# Patient Record
Sex: MALE | Race: OTHER | HISPANIC OR LATINO | Employment: FULL TIME | URBAN - NONMETROPOLITAN AREA
[De-identification: names, ages, dates, MRNs, and addresses within clinical notes are randomized per-mention and may not be internally consistent; named-entity substitution may affect disease eponyms.]

---

## 2023-02-03 ENCOUNTER — APPOINTMENT (EMERGENCY)
Dept: RADIOLOGY | Facility: HOSPITAL | Age: 18
End: 2023-02-03

## 2023-02-03 ENCOUNTER — HOSPITAL ENCOUNTER (EMERGENCY)
Facility: HOSPITAL | Age: 18
Discharge: HOME | End: 2023-02-03
Attending: EMERGENCY MEDICINE

## 2023-02-03 VITALS
RESPIRATION RATE: 15 BRPM | HEART RATE: 86 BPM | TEMPERATURE: 97.9 F | BODY MASS INDEX: 24.99 KG/M2 | WEIGHT: 150 LBS | DIASTOLIC BLOOD PRESSURE: 69 MMHG | HEIGHT: 65 IN | SYSTOLIC BLOOD PRESSURE: 129 MMHG | OXYGEN SATURATION: 98 %

## 2023-02-03 DIAGNOSIS — R45.89 SAD: ICD-10-CM

## 2023-02-03 DIAGNOSIS — F41.9 ANXIETY: Primary | ICD-10-CM

## 2023-02-03 LAB
ANION GAP SERPL CALCULATED.3IONS-SCNC: 6 MMOL/L (ref 4–13)
BASOPHILS # BLD AUTO: 0.03 THOUSANDS/ÂΜL (ref 0–0.1)
BASOPHILS NFR BLD AUTO: 1 % (ref 0–1)
BUN SERPL-MCNC: 10 MG/DL (ref 5–25)
CALCIUM SERPL-MCNC: 9.4 MG/DL (ref 8.4–10.2)
CHLORIDE SERPL-SCNC: 107 MMOL/L (ref 96–108)
CO2 SERPL-SCNC: 26 MMOL/L (ref 21–32)
CREAT SERPL-MCNC: 0.8 MG/DL (ref 0.6–1.3)
EOSINOPHIL # BLD AUTO: 0.06 THOUSAND/ÂΜL (ref 0–0.61)
EOSINOPHIL NFR BLD AUTO: 1 % (ref 0–6)
ERYTHROCYTE [DISTWIDTH] IN BLOOD BY AUTOMATED COUNT: 12.6 % (ref 11.6–15.1)
GFR SERPL CREATININE-BSD FRML MDRD: 129 ML/MIN/1.73SQ M
GLUCOSE SERPL-MCNC: 97 MG/DL (ref 65–140)
HCT VFR BLD AUTO: 43.7 % (ref 36.5–49.3)
HGB BLD-MCNC: 14.6 G/DL (ref 12–17)
IMM GRANULOCYTES # BLD AUTO: 0.02 THOUSAND/UL (ref 0–0.2)
IMM GRANULOCYTES NFR BLD AUTO: 0 % (ref 0–2)
LYMPHOCYTES # BLD AUTO: 1.47 THOUSANDS/ÂΜL (ref 0.6–4.47)
LYMPHOCYTES NFR BLD AUTO: 22 % (ref 14–44)
MAGNESIUM SERPL-MCNC: 2 MG/DL (ref 1.9–2.7)
MCH RBC QN AUTO: 28.6 PG (ref 26.8–34.3)
MCHC RBC AUTO-ENTMCNC: 33.4 G/DL (ref 31.4–37.4)
MCV RBC AUTO: 86 FL (ref 82–98)
MONOCYTES # BLD AUTO: 0.42 THOUSAND/ÂΜL (ref 0.17–1.22)
MONOCYTES NFR BLD AUTO: 6 % (ref 4–12)
NEUTROPHILS # BLD AUTO: 4.62 THOUSANDS/ÂΜL (ref 1.85–7.62)
NEUTS SEG NFR BLD AUTO: 70 % (ref 43–75)
NRBC BLD AUTO-RTO: 0 /100 WBCS
PLATELET # BLD AUTO: 271 THOUSANDS/UL (ref 149–390)
PMV BLD AUTO: 9.2 FL (ref 8.9–12.7)
POTASSIUM SERPL-SCNC: 3.5 MMOL/L (ref 3.5–5.3)
RBC # BLD AUTO: 5.1 MILLION/UL (ref 3.88–5.62)
SODIUM SERPL-SCNC: 139 MMOL/L (ref 135–147)
TSH SERPL DL<=0.05 MIU/L-ACNC: 1.05 UIU/ML (ref 0.45–4.5)
WBC # BLD AUTO: 6.62 THOUSAND/UL (ref 4.31–10.16)

## 2023-02-03 RX ORDER — HYDROXYZINE HYDROCHLORIDE 25 MG/1
25 TABLET, FILM COATED ORAL ONCE
Status: COMPLETED | OUTPATIENT
Start: 2023-02-03 | End: 2023-02-03

## 2023-02-03 RX ORDER — HYDROXYZINE PAMOATE 25 MG/1
25 CAPSULE ORAL 3 TIMES DAILY PRN
Qty: 30 CAPSULE | Refills: 0 | Status: SHIPPED | OUTPATIENT
Start: 2023-02-03

## 2023-02-03 RX ADMIN — HYDROXYZINE HYDROCHLORIDE 25 MG: 25 TABLET ORAL at 05:02

## 2023-02-03 NOTE — ED PROVIDER NOTES
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  He pulled over and began crying  Endorses feeling sad  Patient states he and his girlfriend broke up last week  Says that "I loved her " Since then, he has been feeling sad and more anxious  He does not want to die or hurt himself  Says he has a supportive family  Denies drugs, alcohol    Denies suicidal or homicidal ideations or self-injury  History reviewed  No pertinent past medical history  History reviewed  No pertinent surgical history  History reviewed  No pertinent family history  I have reviewed and agree with the history as documented  E-Cigarette/Vaping   • E-Cigarette Use Current Every Day User      E-Cigarette/Vaping Substances   • Nicotine Yes    • THC No    • CBD No    • Flavoring Yes    • Other No    • Unknown No      Social History     Tobacco Use   • Smoking status: Never   • Smokeless tobacco: Never   Vaping Use   • Vaping Use: Every day   • Substances: Nicotine, Flavoring   Substance Use Topics   • Alcohol use: Never   • Drug use: Not Currently       Review of Systems   Constitutional: Negative for chills and fever  HENT: Negative for ear pain and sore throat  Eyes: Negative for pain and visual disturbance  Respiratory: Negative for cough and shortness of breath  Cardiovascular: Negative for chest pain and palpitations  Gastrointestinal: Negative for abdominal pain and vomiting  Genitourinary: Negative for dysuria and hematuria  Musculoskeletal: Negative for arthralgias and back pain  Skin: Negative for color change and rash  Neurological: Negative for seizures and syncope  Psychiatric/Behavioral: Positive for dysphoric mood  Negative for self-injury and suicidal ideas  The patient is nervous/anxious  All other systems reviewed and are negative  Physical Exam  Physical Exam  Vitals and nursing note reviewed  Constitutional:       Appearance: He is well-developed  HENT:      Head: Normocephalic and atraumatic  Right Ear: External ear normal       Left Ear: External ear normal       Nose: Nose normal    Eyes:      Conjunctiva/sclera: Conjunctivae normal    Cardiovascular:      Rate and Rhythm: Normal rate and regular rhythm  Pulmonary:      Effort: Pulmonary effort is normal  No respiratory distress  Breath sounds: Normal breath sounds  Abdominal:      Palpations: Abdomen is soft  Tenderness: There is no abdominal tenderness  Musculoskeletal:      Cervical back: Normal range of motion and neck supple  Skin:     General: Skin is warm and dry  Neurological:      General: No focal deficit present  Mental Status: He is alert  Mental status is at baseline  Psychiatric:         Mood and Affect: Mood is anxious and depressed  Affect is flat and tearful  Behavior: Behavior is withdrawn  Thought Content: Thought content does not include homicidal or suicidal ideation  Comments: Soft spoken voice  Tearful when speaking about his ex-girlfriend           Vital Signs  ED Triage Vitals [02/03/23 0445]   Temperature Pulse Respirations Blood Pressure SpO2   97 9 °F (36 6 °C) 104 18 142/85 96 %      Temp Source Heart Rate Source Patient Position - Orthostatic VS BP Location FiO2 (%)   Temporal Monitor Sitting Left arm --      Pain Score       8           Vitals:    02/03/23 0445 02/03/23 0600   BP: 142/85 129/69   Pulse: 104 86   Patient Position - Orthostatic VS: Sitting Sitting Visual Acuity      ED Medications  Medications   hydrOXYzine HCL (ATARAX) tablet 25 mg (25 mg Oral Given 2/3/23 0502)       Diagnostic Studies  Results Reviewed     Procedure Component Value Units Date/Time    TSH [[id number]]  (Normal) Collected: 02/03/23 0505    Lab Status: Final result Specimen: Blood from Arm, Right Updated: 02/03/23 0546     TSH 3RD GENERATON 1 052 uIU/mL     Narrative:      Patients undergoing fluorescein dye angiography may retain small amounts of fluorescein in the body for 48-72 hours post procedure  Samples containing fluorescein can produce falsely depressed TSH values  If the patient had this procedure,a specimen should be resubmitted post fluorescein clearance        Basic metabolic panel [[id number]] Collected: 02/03/23 0505    Lab Status: Final result Specimen: Blood from Arm, Right Updated: 02/03/23 0534     Sodium 139 mmol/L      Potassium 3 5 mmol/L      Chloride 107 mmol/L      CO2 26 mmol/L      ANION GAP 6 mmol/L      BUN 10 mg/dL      Creatinine 0 80 mg/dL      Glucose 97 mg/dL      Calcium 9 4 mg/dL      eGFR 129 ml/min/1 73sq m     Narrative:      [facility] guidelines for Chronic Kidney Disease (CKD):   •  Stage 1 with normal or high GFR (GFR > 90 mL/min/1 73 square meters)  •  Stage 2 Mild CKD (GFR = 60-89 mL/min/1 73 square meters)  •  Stage 3A Moderate CKD (GFR = 45-59 mL/min/1 73 square meters)  •  Stage 3B Moderate CKD (GFR = 30-44 mL/min/1 73 square meters)  •  Stage 4 Severe CKD (GFR = 15-29 mL/min/1 73 square meters)  •  Stage 5 End Stage CKD (GFR <15 mL/min/1 73 square meters)  Note: GFR calculation is accurate only with a steady state creatinine    Magnesium [[id number]]  (Normal) Collected: 02/03/23 0505    Lab Status: Final result Specimen: Blood from Arm, Right Updated: 02/03/23 0534     Magnesium 2 0 mg/dL     CBC and differential [[id number]] Collected: 02/03/23 0505    Lab Status: Final result Specimen: Blood from Arm, Right Updated: 02/03/23 0516     WBC 6 62 Thousand/uL      RBC 5 10 Million/uL      Hemoglobin 14 6 g/dL      Hematocrit 43 7 %      MCV 86 fL      MCH 28 6 pg      MCHC 33 4 g/dL      RDW 12 6 %      MPV 9 2 fL      Platelets 109 Thousands/uL      nRBC 0 /100 WBCs      Neutrophils Relative 70 %      Immat GRANS % 0 %      Lymphocytes Relative 22 %      Monocytes Relative 6 %      Eosinophils Relative 1 %      Basophils Relative 1 %      Neutrophils Absolute 4 62 Thousands/µL      Immature Grans Absolute 0 02 Thousand/uL      Lymphocytes Absolute 1 47 Thousands/µL      Monocytes Absolute 0 42 Thousand/µL      Eosinophils Absolute 0 06 Thousand/µL      Basophils Absolute 0 03 Thousands/µL                XR chest 1 view portable   ED Interpretation by Edvin Bradford MD (11/79 [id number])   No acute cardiopulm abnl      Final Result by Luz Chavez MD (02/03 8422)      No acute cardiopulmonary disease  Workstation performed: [id number]                Procedures  Procedures     ED Course  ED Course as of 02/03/23 1723   Fri Feb 03, 2023   0531 WBC: 6 62   0531 Hemoglobin: 14 6   0539 Magnesium: 2 0   0539 Sodium: 139   0539 Potassium: 3 5   0552 TSH 3RD GENERATON: 1 052     Medical Decision Making  17M presenting with anxiety attack  Appears anxious and withdrawn on arrival  Trigger appears to be social stressors such as recent break up with his girlfriend  Patient tearful when describing his feelings with her and their relationship  He denies any suicidal or homicidal ideations and denies any self-injurious behavior  Patient is cooperative; endorses good family support system  Patient w/o prior psychiatric history therefore workup done to eval for other causes of anxiety/depression such as electrolyte abnl, hypothyroidism, anemia, infection  CBC and CMP wnl  TSH wnl  Patient was given hydroxyzine for his anxiety symptoms and felt some improvement  Patient prescribed hydroxyzine PRN for anxiety symptoms   He does not have a PCP therefore resources given for primary care clinics  Referral to psychology also placed  Patient discharged in stable condition  Return precautions were advised  Anxiety: acute illness or injury  Amount and/or Complexity of Data Reviewed  Labs: ordered  Decision-making details documented in ED Course  Radiology: ordered and independent interpretation performed  Risk  Prescription drug management  Disposition  Final diagnoses:   Anxiety   Sad     Time reflects when diagnosis was documented in both MDM as applicable and the Disposition within this note     Time User Action Codes Description Comment    2/3/2023  6:01 AM Anderson Riggs [F41 9] Anxiety     2/3/2023  5:23 PM Anderson Riggs [R45 [id number]] Sad       ED Disposition     ED Disposition   Discharge    Condition   Stable    Date/Time   Fri Feb 3, 2023  6:01 AM    Comment   Junior Fernandez discharge to home/self care                 Follow-up Information    None         Discharge Medication List as of 2/3/2023  6:03 AM      START taking these medications    Details   hydrOXYzine pamoate (VISTARIL) 25 mg capsule Take 1 capsule (25 mg total) by mouth 3 (three) times a day as needed for itching, Starting Fri 2/3/2023, Normal                 PDMP Review     None          ED Provider  Electronically Signed by           Milagros Rodriguez MD  02/03/23 4057

## 2023-02-03 NOTE — DISCHARGE INSTRUCTIONS
You have been prescribed hydroxyzine as needed for anxiety  Take it only as needed  A referral to psychology has been placed for you to follow-up and discuss your feelings  Return to the ED for worsening symptoms

## 2023-02-14 ENCOUNTER — TELEPHONE (OUTPATIENT)
Dept: URGENT CARE | Facility: CLINIC | Age: 18
End: 2023-02-14

## 2023-02-14 NOTE — TELEPHONE ENCOUNTER
Patient called office stating he needs a note to be excused from work  Reports she was in the ER on 2/3 for an anxiety attack  States he tried calling the ER to be excused from work but they will not write him a note because that provider that saw him is not on shift  States he is feeling much better today and feels capable of completing his job without difficulty  Note written for patient to return to work

## 2023-02-14 NOTE — LETTER
February 14, 2023       Patient: Junior Gayla Pereira   YOB: 2005   Date of Visit: 2/14/2023           Junior aGyla Pereira may return to work without restrictions on 2/14/2023          Sincerely,        Marylou Wheat PA-C